# Patient Record
Sex: MALE | Race: BLACK OR AFRICAN AMERICAN | Employment: FULL TIME | URBAN - METROPOLITAN AREA
[De-identification: names, ages, dates, MRNs, and addresses within clinical notes are randomized per-mention and may not be internally consistent; named-entity substitution may affect disease eponyms.]

---

## 2018-12-17 ENCOUNTER — HOSPITAL ENCOUNTER (EMERGENCY)
Age: 43
Discharge: HOME | End: 2018-12-18
Attending: EMERGENCY MEDICINE
Payer: COMMERCIAL

## 2018-12-17 DIAGNOSIS — N34.2 URETHRITIS: Primary | ICD-10-CM

## 2018-12-17 LAB
APPEARANCE UR: ABNORMAL
BACTERIA URNS QL MICRO: 0 /HPF
BILIRUB UR QL: NEGATIVE
CASTS URNS QL MICRO: ABNORMAL /LPF
COLOR UR: YELLOW
EPI CELLS #/AREA URNS HPF: ABNORMAL /HPF
GLUCOSE UR STRIP.AUTO-MCNC: NEGATIVE MG/DL
HGB UR QL STRIP: ABNORMAL
KETONES UR QL STRIP.AUTO: NEGATIVE MG/DL
LEUKOCYTE ESTERASE UR QL STRIP.AUTO: ABNORMAL
NITRITE UR QL STRIP.AUTO: NEGATIVE
PH UR STRIP: 6.5 [PH] (ref 5–9)
PROT UR STRIP-MCNC: ABNORMAL MG/DL
RBC #/AREA URNS HPF: ABNORMAL /HPF
SP GR UR REFRACTOMETRY: 1.03 (ref 1–1.02)
UROBILINOGEN UR QL STRIP.AUTO: 1 EU/DL (ref 0.2–1)
WBC URNS QL MICRO: 0 /HPF

## 2018-12-17 PROCEDURE — 81001 URINALYSIS AUTO W/SCOPE: CPT

## 2018-12-17 PROCEDURE — 87491 CHLMYD TRACH DNA AMP PROBE: CPT

## 2018-12-17 PROCEDURE — 99283 EMERGENCY DEPT VISIT LOW MDM: CPT | Performed by: EMERGENCY MEDICINE

## 2018-12-17 NOTE — LETTER
12/21/2018 Jalene Meigs 
[facility] [id number] Dear Mr. Monroy, You were recently seen in the Emergency Department of [facility] and had blood work or x-rays performed. We would like to discuss these with you. Please call the Emergency Department at your earliest convenience. If you were seen at [address] [place] [address], please dial [phone], and if you were seen at [facility], please call [phone] to speak with one of our providers. Sincerely, Soledad Leon MD 
Medical Director Emergency Services, [address] [place] [address]  
[phone]/ [phone]

## 2018-12-18 VITALS
TEMPERATURE: 97.4 F | HEIGHT: 66 IN | DIASTOLIC BLOOD PRESSURE: 58 MMHG | BODY MASS INDEX: 23.63 KG/M2 | OXYGEN SATURATION: 100 % | HEART RATE: 70 BPM | SYSTOLIC BLOOD PRESSURE: 145 MMHG | WEIGHT: 147 LBS | RESPIRATION RATE: 16 BRPM

## 2018-12-18 PROCEDURE — 74011250636 HC RX REV CODE- 250/636: Performed by: EMERGENCY MEDICINE

## 2018-12-18 PROCEDURE — 74011250637 HC RX REV CODE- 250/637: Performed by: EMERGENCY MEDICINE

## 2018-12-18 PROCEDURE — 96372 THER/PROPH/DIAG INJ SC/IM: CPT | Performed by: EMERGENCY MEDICINE

## 2018-12-18 RX ORDER — AZITHROMYCIN 250 MG/1
1000 TABLET, FILM COATED ORAL
Status: COMPLETED | OUTPATIENT
Start: 2018-12-18 | End: 2018-12-18

## 2018-12-18 RX ADMIN — AZITHROMYCIN 1000 MG: 250 TABLET, FILM COATED ORAL at 00:22

## 2018-12-18 RX ADMIN — CEFTRIAXONE SODIUM 250 MG: 250 INJECTION, POWDER, FOR SOLUTION INTRAMUSCULAR; INTRAVENOUS at 00:23

## 2018-12-18 NOTE — ED NOTES
I have reviewed discharge instructions with the patient. The patient verbalized understanding. Patient left ED via Discharge Method: ambulatory to Home with (, self,). Opportunity for questions and clarification provided. Patient given 0 scripts. To continue your aftercare when you leave the hospital, you may receive an automated call from our care team to check in on how you are doing. This is a free service and part of our promise to provide the best care and service to meet your aftercare needs.  If you have questions, or wish to unsubscribe from this service please call [phone]. Thank you for Choosing our [facility] Emergency Department.

## 2018-12-18 NOTE — ED PROVIDER NOTES
For at least 4 days. He has had spontaneous yellow urethral discharge. He has significant burning when he voids as well. No sores or lesions. He has a single regular female sexual partner        Exposure to STD   The incident occurred more than 2 days ago. The sexual encounter was with a regular sexual partner. Associated symptoms include penile discharge. Pertinent negatives include no penile pain. There is a concern regarding sexually transmitted diseases. There is no HIV concern. He has tried nothing for the symptoms. Urinary Pain    Associated symptoms include penile discharge. History reviewed. No pertinent past medical history. History reviewed. No pertinent surgical history. History reviewed. No pertinent family history. Social History     Socioeconomic History    Marital status: SINGLE     Spouse name: Not on file    Number of children: Not on file    Years of education: Not on file    Highest education level: Not on file   Social Needs    Financial resource strain: Not on file    Food insecurity - worry: Not on file    Food insecurity - inability: Not on file    Transportation needs - medical: Not on file   [place] [place] needs - non-medical: Not on file   Occupational History    Not on file   Tobacco Use    Smoking status: Current Every Day Smoker     Packs/day: 0.50    Smokeless tobacco: Never Used   Substance and Sexual Activity    Alcohol use: Yes     Comment: socially    Drug use: No    Sexual activity: Not on file   Other Topics Concern    Not on file   Social History Narrative    Not on file         ALLERGIES: Tomato    Review of Systems   Cardiovascular: Negative. Genitourinary: Positive for discharge and penile discharge. Negative for genital sores, penile pain, penile swelling, scrotal swelling and testicular pain. Skin: Negative. Negative for rash. All other systems reviewed and are negative.       Vitals:    12/17/18 2107   BP: 153/58   Pulse: 74 Resp: 18   Temp: 97.4 °F (36.3 °C)   SpO2: 100%   Weight: 66.7 kg (147 lb)   Height: 5' 6\" (1.676 m)            Physical Exam   Constitutional: He appears well-developed and well-nourished. No distress. HENT:   Head: Atraumatic. Neck: Normal range of motion. Cardiovascular: Normal rate. Pulmonary/Chest: Effort normal.   Genitourinary: Testes normal. Circumcised. Discharge found. Genitourinary Comments: Squeeze of penis elicits a large volume of yellow urethral discharge   Musculoskeletal: Normal range of motion. Nursing note reviewed. MDM  Number of Diagnoses or Management Options  Diagnosis management comments: STD suspicious for gonorrhea.        Amount and/or Complexity of Data Reviewed  Clinical lab tests: ordered  Decide to obtain previous medical records or to obtain history from someone other than the patient: yes    Risk of Complications, Morbidity, and/or Mortality  Diagnostic procedures: minimal  Management options: low    Patient Progress  Patient progress: stable         Procedures      Recent Results (from the past 12 hour(s))   URINALYSIS W/ RFLX MICROSCOPIC    Collection Time: 12/17/18  9:28 PM   Result Value Ref Range    Color YELLOW      Appearance CLOUDY      Specific gravity 1.026 (H) 1.001 - 1.023      pH (UA) 6.5 5.0 - 9.0      Protein TRACE (A) NEG mg/dL    Glucose NEGATIVE  mg/dL    Ketone NEGATIVE  NEG mg/dL    Bilirubin NEGATIVE  NEG      Blood TRACE (A) NEG      Urobilinogen 1.0 0.2 - 1.0 EU/dL    Nitrites NEGATIVE  NEG      Leukocyte Esterase LARGE (A) NEG      WBC 0 0 /hpf    RBC 0-3 0 /hpf    Epithelial cells 0-3 0 /hpf    Bacteria 0 0 /hpf    Casts 0-3 0 /lpf

## 2018-12-20 LAB
C TRACH RRNA SPEC QL NAA+PROBE: NEGATIVE
N GONORRHOEA RRNA SPEC QL NAA+PROBE: POSITIVE
SPECIMEN SOURCE: ABNORMAL

## 2018-12-21 NOTE — PROGRESS NOTES
Toya White   39 y.o. [phone] (home)     Pt with positive GC cultures. ED TREATMENT:  Orders Placed This Encounter      Manda Forrest / LAURA      INITIATE: SPECIFY Initiate Presenting Complaint Protocol ONE TIME Routine      DISCONTD: cefTRIAXone (ROCEPHIN) 250 mg in lidocaine (XYLOCAINE) 10 mg/mL (1 %) IM syringe      azithromycin (ZITHROMAX) tablet 1,000 mg      Patient needs: notification  Patient called. Unable to reach.  Letter mailed    PAIGE Rocha; 12/21/2018 @9:16 AM===========================================